# Patient Record
Sex: MALE | URBAN - METROPOLITAN AREA
[De-identification: names, ages, dates, MRNs, and addresses within clinical notes are randomized per-mention and may not be internally consistent; named-entity substitution may affect disease eponyms.]

---
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
## 2018-02-07 ENCOUNTER — OFFICE VISIT (OUTPATIENT)
Dept: PEDIATRICS | Facility: CLINIC | Age: 18
End: 2018-02-07
Payer: COMMERCIAL

## 2018-02-07 DIAGNOSIS — F90.0 ATTENTION DEFICIT HYPERACTIVITY DISORDER (ADHD), PREDOMINANTLY INATTENTIVE TYPE: Primary | ICD-10-CM

## 2018-02-07 DIAGNOSIS — F41.1 GENERALIZED ANXIETY DISORDER: ICD-10-CM

## 2018-02-07 NOTE — LETTER
"  2/7/2018      RE: Ramesh Jo  [address]  [facility] [address]       SUBJECTIVE:  Ramesh is a 17 year old male, here for follow-up of developmental-behavioral problems.     Interim History:    working 1 long shift per week at [employer] at the [employer]    enjoying taking clarinet lessons via [employer] (parents are paying for it) and teaching himself guitar; might do some clarinet stuff with friends who are in bands although he'd like to learn to improvise first    not reading or studying mathematics as he'd thought he'd do, feels there will be plenty of that when he again commits to some form of college    however, he's unsure about how to proceed with college -- considering starting again next fall, likely at  of [place] again, went to an orientation recently that's required for those wanting to do the create-your-own-major type of path but he's wondering if it's possible to that AND to have most/all of it be \"independent study\"/project/mentored type work because he feels that going to traditional lecture-based classes does not work for his brain.  He's okay with taking tests, writing papers, doing readings, etc., but this ideally happens best for him, he thinks, when he's able to plan out his path and learning objectives ahead of time    he'd like this area of study to include mathematics and physics, and possibly music    he's comfortable taking a lower credit load and/or taking longer to graduate if need be    he recently went back to see Dr. Vitale for individual psychotherapy after being absent a few months and plans to continue seeing him a few times over the next few months    Objective:  There were no vitals taken for this visit.   EXAM:  Developmental and Behavioral: affect normal/bright and mood congruent  impulse control appropriate for context  attention span appropriate for context  restless  hyperkinetic  social reciprocity appropriate for developmental age  joint attention appropriate " for developmental age  no preoccupations, stereotypies, or atypical behavioral mannerisms  judgment and insight intact  mentation appears normal  no tics       DATA:  The following standardized developmental-behavioral assessments were scored and interpreted today with them, distinct from the rest of the evaluation and management that took place:  1. n/a    As described below, today's Diagnostic ASSESSMENT and Diagnostic/Therapeutic PLAN were discussed with the patient and family, and I provided them with extensive counseling and eduction as follows:  1. Attention deficit hyperactivity disorder (ADHD), predominantly inattentive type    2. Generalized anxiety disorder        Overall, ongoing challenges with intrinsic motivation-reward, leading to current problems moving forward with academic-career goals, associated with attention-deficit/hyperactivity disorder and anxiety    Diagnostic Plan:    deferred     Counseled regarding:    self-efficacy    ego-strengthening suggestions    motivational interviewing regarding next steps and behavioral activation for academics    guidance regarding options including other campuses/settings, how to have construction concurrent conversations with academic advisors (and find the right people) and Disability Services so that they can all work together to help him find a good fit academically    Therapeutic Interventions:    continue individual psychotherapy with Dr. Vitale    Current Outpatient Prescriptions   Medication Sig Dispense Refill     ORDER FOR DME Equipment being ordered:emWave ([employer]) personal stress reliever and/or PC-desktop for heart rate variability biofeedback training. 2 each 0     There are no discontinued medications.      Psychotropic medication not indicated at this time     Follow-up -- 3-4 months       40 minutes and More than 50% of the time spent on counseling / coordinating care    Bin Perrin MD, MPH  , [facility]   [place]  Developmental-Behavioral Pediatrics  __________________________________________________________

## 2018-02-07 NOTE — MR AVS SNAPSHOT
After Visit Summary   2/7/2018    Ramesh Jo    MRN: [id number]           Patient Information     Date Of Birth          2000        Visit Information        Provider Department      2/7/2018 2:20 PM Bin Perrin MD [facility]        Today's Diagnoses     Attention deficit hyperactivity disorder (ADHD), predominantly inattentive type    -  1    Generalized anxiety disorder           Follow-ups after your visit        Your next 10 appointments already scheduled     Jun 27, 2018 11:00 AM CDT   RETURN EXTENDED with Bin Perrin MD   [facility] ([facility])    [address] [place] [address]  [address]  Mail Code 1932  [facility] [id number] [phone]              Who to contact     Please call your clinic at [phone] to:    Ask questions about your health    Make or cancel appointments    Discuss your medicines    Learn about your test results    Speak to your doctor   If you have compliments or concerns about an experience at your clinic, or if you wish to file a complaint, please contact [facility] Patient Relations at [phone] or email us at [email or web address]         Additional Information About Your Visit        MyChart Information     [facility] gives you secure access to your electronic health record. If you see a primary care provider, you can also send messages to your care team and make appointments. If you have questions, please call your primary care clinic.  If you do not have a primary care provider, please call [phone] and they will assist you.      [employer] is an electronic gateway that provides easy, online access to your medical records. With [employer], you can request a clinic appointment, read your test results, renew a prescription or communicate with your care team.     To access your existing account, please contact your [facility] or call [phone] for assistance.        Care EveryWhere ID     This is your Care EveryWhere ID. This could be used by other organizations to access your [facility] medical records  [id number]         Blood Pressure from Last 3 Encounters:   07/31/14 117/77   09/23/13 111/78   04/26/13 105/57    Weight from Last 3 Encounters:   07/31/14 125 lb 10.6 oz (57 kg) (29 %)*   09/23/13 123 lb 14.4 oz (56.2 kg) (40 %)*   04/26/13 120 lb (54.4 kg) (41 %)*     * Growth percentiles are based on [facility] 2-20 Years data.              Today, you had the following     No orders found for display       Primary Care Provider Office Phone # Fax #    Fidel Moody [phone]       [facility] [address] [address]  [address] [address]        Equal Access to Services     SOBEIDA SHAY AH: Hadii tyler ku hadasho Soomaali, waaxda luqadaha, qaybta kaalmada adeegyada, kayy fernández . So [facility] [phone].    ATENCIÓN: Si earle gallo, tiene a patricia disposición servicios gratuitos de asistencia lingüística. Llame al [phone].    We comply with applicable federal civil rights laws and [place] laws. We do not discriminate on the basis of race, color, national origin, age, disability, sex, sexual orientation, or gender identity.            Thank you!     Thank you for choosing [facility]  for your care. Our goal is always to provide you with excellent care. Hearing back from our patients is one way we can continue to improve our services. Please take a few minutes to complete the written survey that you may receive in the mail after your visit with us. Thank you!             Your Updated Medication List - Protect others around you: Learn how to safely use, store and throw away your medicines at www.disposemymeds.org.          This list is accurate as of 2/7/18 11:59 PM.  Always use your most recent med list.                   Brand  Name Dispense Instructions for use Diagnosis    order for DME     2 each    Equipment being ordered:emWave ([employer]) personal stress reliever and/or PC-desktop for heart rate variability biofeedback training.    Anxiety, Arachnophobia, Chronic motor tic disorder                  Developmental - Behavioral Pediatrics Clinic    Thank you for choosing [facility] for your health care needs. Below is some information for patients who are interested in having their follow-up visit with a physician by telephone. In some cases, a telephone visit can be an effective and convenient way to manage your follow-up care. Choosing a telephone visit rather than a face to face visit for your follow-up care is a decision that you and your physician can make together to ensure it meets all of your needs.  A face to face visit is always an available option, if you choose to do so.     We want to make sure you have all of the information you need about the telephone visit option and answer all of your questions before you decide to schedule a telephone follow-up visit. If you have any questions, you may talk to a staff member or our financial counselor at [phone].    1. General overview    Our clinic sees patients for a variety of conditions and concerns. A face to face visit with your doctor is required for any new concerns or for your initial visit. If you and your doctor decide that a follow up visit by telephone is appropriate, you may decide to opt for a telephone visit.     2.  Billing and insurance coverage    There is a charge for telephone visits, similar to the charge for an in-person visit. Your bill is based on the amount of time you and your physician are on the phone. We will bill each visit to your insurance company (just like your other medical visits), and you will be responsible for any costs not paid by your insurance company. Not all insurance companies cover theses visits. At this time,  we are aware that this is NOT a covered service by [place] Health Care Programs (Medical Assistance Plans), [facility] Blue Shield and Medicare. If you want to know what your insurance company will cover, we encourage you to contact them to determine your coverage. The codes below are the codes we use when billing for telephone visits and the associated charges. This may help you work with your insurance company to determine your benefits.       Billing CPT codes for Telephone visits   97156  5-10 minutes ($30)  38587  11-20 minutes ($35)  37436   21-30 minutes($40)    To schedule a telephone appointment call the clinic at: [phone] and press option #2.   ---------------------------------------------------------------------------------------------------------------------

## 2018-02-08 NOTE — PROGRESS NOTES
"SUBJECTIVE:  Ramesh is a 17 year old male, here for follow-up of developmental-behavioral problems.     Interim History:    working 1 long shift per week at [employer] at the [employer]    enjoying taking clarinet lessons via [employer] (parents are paying for it) and teaching himself guitar; might do some clarinet stuff with friends who are in bands although he'd like to learn to improvise first    not reading or studying mathematics as he'd thought he'd do, feels there will be plenty of that when he again commits to some form of college    however, he's unsure about how to proceed with college -- considering starting again next fall, likely at [employer] again, went to an orientation recently that's required for those wanting to do the create-your-own-major type of path but he's wondering if it's possible to that AND to have most/all of it be \"independent study\"/project/mentored type work because he feels that going to traditional lecture-based classes does not work for his brain.  He's okay with taking tests, writing papers, doing readings, etc., but this ideally happens best for him, he thinks, when he's able to plan out his path and learning objectives ahead of time    he'd like this area of study to include mathematics and physics, and possibly music    he's comfortable taking a lower credit load and/or taking longer to graduate if need be    he recently went back to see Dr. Vitale for individual psychotherapy after being absent a few months and plans to continue seeing him a few times over the next few months    Objective:  There were no vitals taken for this visit.   EXAM:  Developmental and Behavioral: affect normal/bright and mood congruent  impulse control appropriate for context  attention span appropriate for context  restless  hyperkinetic  social reciprocity appropriate for developmental age  joint attention appropriate for developmental age  no preoccupations, stereotypies, or atypical behavioral " mannerisms  judgment and insight intact  mentation appears normal  no tics       DATA:  The following standardized developmental-behavioral assessments were scored and interpreted today with them, distinct from the rest of the evaluation and management that took place:  1. n/a    As described below, today's Diagnostic ASSESSMENT and Diagnostic/Therapeutic PLAN were discussed with the patient and family, and I provided them with extensive counseling and eduction as follows:  1. Attention deficit hyperactivity disorder (ADHD), predominantly inattentive type    2. Generalized anxiety disorder        Overall, ongoing challenges with intrinsic motivation-reward, leading to current problems moving forward with academic-career goals, associated with attention-deficit/hyperactivity disorder and anxiety    Diagnostic Plan:    deferred     Counseled regarding:    self-efficacy    ego-strengthening suggestions    motivational interviewing regarding next steps and behavioral activation for academics    guidance regarding options including other campuses/settings, how to have construction concurrent conversations with academic advisors (and find the right people) and Disability Services so that they can all work together to help him find a good fit academically    Therapeutic Interventions:    continue individual psychotherapy with Dr. Vitale    Current Outpatient Prescriptions   Medication Sig Dispense Refill     ORDER FOR DME Equipment being ordered:emWave ([employer]) personal stress reliever and/or PC-desktop for heart rate variability biofeedback training. 2 each 0     There are no discontinued medications.      Psychotropic medication not indicated at this time     Follow-up -- 3-4 months       40 minutes and More than 50% of the time spent on counseling / coordinating care    Bin Perrin MD, MPH  , [facility]  Developmental-Behavioral  Pediatrics  __________________________________________________________

## 2018-04-18 ENCOUNTER — TRANSFERRED RECORDS (OUTPATIENT)
Dept: HEALTH INFORMATION MANAGEMENT | Facility: CLINIC | Age: 18
End: 2018-04-18

## 2018-04-25 ENCOUNTER — OFFICE VISIT (OUTPATIENT)
Dept: PEDIATRICS | Facility: CLINIC | Age: 18
End: 2018-04-25
Payer: COMMERCIAL

## 2018-04-25 DIAGNOSIS — F95.1 CHRONIC MOTOR TIC DISORDER: ICD-10-CM

## 2018-04-25 DIAGNOSIS — E88.89 CYTOCHROME P450 2C19 ENZYME DEFICIENCY (H): ICD-10-CM

## 2018-04-25 DIAGNOSIS — F41.1 GENERALIZED ANXIETY DISORDER: ICD-10-CM

## 2018-04-25 DIAGNOSIS — F90.0 ATTENTION DEFICIT HYPERACTIVITY DISORDER (ADHD), PREDOMINANTLY INATTENTIVE TYPE: Primary | ICD-10-CM

## 2018-04-25 RX ORDER — FLUVOXAMINE MALEATE 100 MG/1
100 CAPSULE, EXTENDED RELEASE ORAL AT BEDTIME
Qty: 30 CAPSULE | Refills: 0 | Status: SHIPPED | OUTPATIENT
Start: 2018-04-25 | End: 2018-06-06

## 2018-04-25 NOTE — LETTER
DEVELOPMENTAL BEHAVIORAL PED[facility]AT[facility]IC CLINIC  [address]  [address]  Mail Code 5502  [facility] [address]  Phone: [phone]  Fax: [phone]       April 25, 2018      Re: Ramesh Jo  YOB: 2000      To Whom It May Concern:    I have been seeing Mr. Jo in consultation as his Developmental-Behavioral Pediatrician since June 2012.  We see each other at least quarterly. He had neuropsychological assessment with Rajani Barnett, PhD at [facility] in 2014.     He has disabilities that impact his education due to the following conditions:  Encounter Diagnoses   Name Primary?     Attention deficit hyperactivity disorder (ADHD), predominantly inattentive type Yes     Generalized anxiety disorder      Chronic motor tic disorder       We have discussed the need for Individualized Study and project-based learning/research credits. I'm happy further any time.    Sincerely,    Bin Perrin MD, MPH, FAAP   & Fellowship Director  Developmental-Behavioral Pediatrics    Email: [email or web address]

## 2018-04-25 NOTE — PROGRESS NOTES
"SUBJECTIVE:  Ramesh is a 18 year old male here for follow-up of developmental-behavioral problems. Today's visit was spent with Neema Samano MD, PL2, rotating pediatric resident on the Developmental-Behavioral service.    Interim History:    discussed recent visit with Dr. Vitale -- see scans -- including how he deals with both his internal critic as well as his Mom, whom he feels is better able to wait for him to come to her for guidance now than when he was in high school, and also that he's better able to gently tell her that he doesn't want or need further help or guidance when necessary    his goal is firmly to get back to formal coursework at [employer] this fall; he still hasn't contacted the [employer] Disability Services office to register with them for support, but he feels that will be the first step and then he can ask them for guidance regarding Independent Study and designing his own major etc.    we also reviewed his anxiety symptoms at length, which include perseveration and low tolerance for ambiguity    he says he's no longer using LSD or related psychotomimetics except very rarely and thinks he'll soon stop \"at least until I'm older\" because he feels they are no longer particularly interesting or helpful to him, especially as he envisions resuming formal education    his current drug of choice is cannabis although he also feels like he'd rather not use it as he said something along the lines of \"it doesn't agree with me\"; he'd like to continue to use this socially \"and not when it's just me [alone]\" and yet he's recently used cannabis a few times \"because I feel like I need to [as opposed to wanting to] . . .  sometimes I just feel frozen up [with anxiety] until I use it\"    he recently visited [place] and was impressed with their careful measurement/testing/standardization of recreational cannabis    he's open to the possibility of using an SSRI again to help him feel more calm and comfortable, but ambivalent " "due to the past negative experiences he's had with such medications and also because he feels he'd rather use a medication along those lines for \"a few months\" and is wary of taking anything long-term    tics not problematic from his perspective; we didn't discuss how cannabis affects tic symptoms for him    Objective:  There were no vitals taken for this visit.   EXAM:  Developmental and Behavioral:   appeared adequately groomed except for being unshaven  affect normal/bright and mood congruent  impulse control appropriate for context  activity level appropriate for context  attention span appropriate for context  social reciprocity appropriate for developmental age  joint attention appropriate for developmental age  stereotyped/repetitive movements of complex motor tics that he often turned into drumming and movement  judgment and insight intact  mentation appears normal    DATA:  The following standardized developmental-behavioral assessments were scored and interpreted today with them, distinct from the rest of the evaluation and management that took place:  1. n/a    As described below, today's Diagnostic ASSESSMENT and Diagnostic/Therapeutic PLAN were discussed with the patient and family, and I provided them with extensive counseling and eduction as follows:  1. Attention deficit hyperactivity disorder (ADHD), predominantly inattentive type    2. Generalized anxiety disorder    3. 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  Tics stable.  Attention-deficit/hyperactivity disorder symptoms are overshadowed and also exacerbated by his anxiety, and his strengths in intellectual and executive function are prognostically positive.      Diagnostic Plan:    we reviewed his 2014 neuropsychological assessment today (as I wrote the letter with him to the [facility] Disability " "Services office and handed it to him to take to them), including his executive function testing (which was very extensive and solidly in the average range), and his and his Mom's self-reports of executive function problems via the BRIEF, Mom's perception of this being in the average range and his showing a self-perception of problems in multiple executive function domains (see Rajani Barnett, PhD's report for details) -- he attributed this discrepancy to Mom's tendency to normalize or minimize (although we didn't discuss yet the discrepancy between his self-report of executive function problems and the objective executive function testing results)    rule out Obsessive-Compulsive Disorder      Counseled regarding:    self-efficacy    ego-strengthening suggestions    multimodal evidence-based interventions for anxiety including self-regulation-oriented biobehavioral practices and cognitive strategies, as well as the role of SSRIs especially during periods of life transition or stress and not necessarily for years on end    psychoeducation about psychoactive properties of cannabis, including my impression that if he uses it he would be best off aiming for a strain with a very large CBD:THC ratio to help with anxiolysis and concentration without dysphoria    motivational interviewing regarding avoidance of LSD and relative psychotomimetic recreational drugs, including the risk of serotonin syndrome once he begins taking an SSRI to help him manage anxiety     motivational interviewing regarding his ambivalence about SSRIs by guiding him towards a self-efficacy model of  the role of these medications and also my impression that an SSRI will ameliorate or eliminate his \"frozen up\" feelings of anxiety that he's currently self-treating with cannabis (and thus reduce his \"alone\" use as he wishes to do)    collaborative problem-solving regarding engaging effectively with academic supports, including calling Disability Services " while I sat with him and setting up his own intake/registration appointment with them which is scheduled for next Monday (he'll bring the letter from today's visit) -- and care coordination with them as I talked with them about what they need from myself and/or Dr. Vitale in terms of a letter (they told me that all they require is a licensed professional signing off on diagnoses that could impact learning)    Therapeutic Interventions:    continue individual psychotherapy with Dr. Vitale     continue daily practices for self-regulation (we did not discuss today)    Current Outpatient Prescriptions   Medication Sig Dispense Refill     fluvoxaMINE Maleate (LUVOX CR) 100 MG 24 hr capsule Take 1 capsule (100 mg) by mouth At Bedtime 30 capsule 0     ORDER FOR DME Equipment being ordered:[employer]) personal stress reliever and/or PC-desktop for heart rate variability biofeedback training. 2 each 0     There are no discontinued medications.      MEDICATIONS:          - Trial of fluvoxamine  mg every night at bedtime; will titrate up by 50 mg every week if needed and as tolerated -- targeting anxiety symptoms     Follow-up -- 1 month, sooner if intolerable adverse effects from fluvoxamine     40 minutes and More than 50% of the time spent on counseling / coordinating care    Bin Perrin MD, MPH  , [facility]  Developmental-Behavioral Pediatrics  __________________________________________________________

## 2018-04-25 NOTE — MR AVS SNAPSHOT
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      Your next 10 appointments already scheduled     Jun 27, 2018 11:00 AM CDT   RETURN EXTENDED with Bin Perrin MD   [facility] ([facility])    [address] [place] [address]  [address]  Mail Code 1932  [facility] [address] [phone]            Oct 03, 2018  9:00 AM CDT   RETURN EXTENDED with Bin Perrin MD   [facility] ([facility])    [address] [place] [address]  Mail Code 1932  [facility] [address] [phone]              Who to contact     Please call your clinic at [phone] to:    Ask questions about your health    Make or cancel appointments    Discuss your medicines    Learn about your test results    Speak to your doctor            Additional Information About Your Visit        MyChart Information     [employer] gives you secure access to your electronic health record. If you see a primary care provider, you can also send messages to your care team and make appointments. If you have questions, please call your primary care clinic.  If you do not have a primary care provider, please call [phone] and they will assist you.      [employer] is an electronic gateway that provides easy, online access to your medical records.  With [employer], you can request a clinic appointment, read your test results, renew a prescription or communicate with your care team.     To access your existing account, please contact your [facility] or call [phone] for assistance.        Care EveryWhere ID     This is your Care EveryWhere ID. This could be used by other organizations to access your [place] medical records  [id number]         Blood Pressure from Last 3 Encounters:   07/31/14 117/77   09/23/13 111/78   04/26/13 105/57    Weight from Last 3 Encounters:   07/31/14 57 kg (125 lb 10.6 oz) (29 %)*   09/23/13 56.2 kg (123 lb 14.4 oz) (40 %)*   04/26/13 54.4 kg (120 lb) (41 %)*     * Growth percentiles are based on [facility] 2-20 Years data.              Today, you had the following     No orders found for display         Today's Medication Changes          These changes are accurate as of 4/25/18 11:59 PM.  If you have any questions, ask your nurse or doctor.               Start taking these medicines.        Dose/Directions    fluvoxaMINE Maleate 100 MG 24 hr capsule   Commonly known as:  LUVOX CR   Used for:  Generalized anxiety disorder        Dose:  100 mg   Take 1 capsule (100 mg) by mouth At Bedtime   Quantity:  30 capsule   Refills:  0            Where to get your medicines      Some of these will need a paper prescription and others can be bought over the counter.  Ask your nurse if you have questions.     Bring a paper prescription for each of these medications     fluvoxaMINE Maleate 100 MG 24 hr capsule                Primary Care Provider Office Phone # Fax #    Fidel Moody [phone]       [facility] [address]  [facility] [address]  [facility] [address]        Equal Access to Services     [facility] : ke Link, kayy cobb. So [facility] [phone].    ATENCIÓN: Si wei quiles  patricia disposición servicios gratuitos de asistencia lingüística. En toscano [phone].    We comply with applicable federal civil rights laws and [place] laws. We do not discriminate on the basis of race, color, national origin, age, disability, sex, sexual orientation, or gender identity.            Thank you!     Thank you for choosing [facility]  for your care. Our goal is always to provide you with excellent care. Hearing back from our patients is one way we can continue to improve our services. Please take a few minutes to complete the written survey that you may receive in the mail after your visit with us. Thank you!             Your Updated Medication List - Protect others around you: Learn how to safely use, store and throw away your medicines at www.disposemymeds.org.          This list is accurate as of 4/25/18 11:59 PM.  Always use your most recent med list.                   Brand Name Dispense Instructions for use Diagnosis    fluvoxaMINE Maleate 100 MG 24 hr capsule    LUVOX CR    30 capsule    Take 1 capsule (100 mg) by mouth At Bedtime    Generalized anxiety disorder       order for DME     2 each    Equipment being ordered:emWave ([employer]) personal stress reliever and/or PC-desktop for heart rate variability biofeedback training.    Anxiety, Arachnophobia, Chronic motor tic disorder                  [facility]al [facility]s Clinic    Thank you for choosing [facility] for your health care needs. Below is some information for patients who are interested in having their follow-up visit with a physician by telephone. In some cases, a telephone visit can be an effective and convenient way to manage your follow-up care. Choosing a telephone visit rather than a face to face visit for your follow-up care is a decision that you and your physician can make together to ensure it meets all of your needs.  A face to face visit is always an  available option, if you choose to do so.     We want to make sure you have all of the information you need about the telephone visit option and answer all of your questions before you decide to schedule a telephone follow-up visit. If you have any questions, you may talk to a staff member or our financial counselor at [phone].    1. General overview    Our clinic sees patients for a variety of conditions and concerns. A face to face visit with your doctor is required for any new concerns or for your initial visit. If you and your doctor decide that a follow up visit by telephone is appropriate, you may decide to opt for a telephone visit.     2.  Billing and insurance coverage    There is a charge for telephone visits, similar to the charge for an in-person visit. Your bill is based on the amount of time you and your physician are on the phone. We will bill each visit to your insurance company (just like your other medical visits), and you will be responsible for any costs not paid by your insurance company. Not all insurance companies cover theses visits. At this time, we are aware that this is NOT a covered service by [place] Health Care Programs (Medical Assistance Plans), [facility] and Medicare. If you want to know what your insurance company will cover, we encourage you to contact them to determine your coverage. The codes below are the codes we use when billing for telephone visits and the associated charges. This may help you work with your insurance company to determine your benefits.       Billing CPT codes for Telephone visits   95876  5-10 minutes ($30)  72252  11-20 minutes ($35)  47320   21-30 minutes($40)    To schedule a telephone appointment call the clinic at: [phone] and press option #2.   ---------------------------------------------------------------------------------------------------------------------

## 2018-04-25 NOTE — Clinical Note
Please send and fax of copy of my note from visit on 4/25, and the letter from that same date, to Dr. Jimmy Vitale at [facility]. Thanks!

## 2018-04-25 NOTE — LETTER
"  4/25/2018      RE: Ramesh Jo  [address]  [facility] [address]       SUBJECTIVE:  Ramesh is a 18 year old male here for follow-up of developmental-behavioral problems. Today's visit was spent with Neema Samano MD, PL2, rotating pediatric resident on the Developmental-Behavioral service.    Interim History:    discussed recent visit with Dr. Vitale -- see scans -- including how he deals with both his internal critic as well as his Mom, whom he feels is better able to wait for him to come to her for guidance now than when he was in high school, and also that he's better able to gently tell her that he doesn't want or need further help or guidance when necessary    his goal is firmly to get back to formal coursework at [employer] this fall; he still hasn't contacted the [email or web address]ability Services office to register with them for support, but he feels that will be the first step and then he can ask them for guidance regarding Independent Study and designing his own major etc.    we also reviewed his anxiety symptoms at length, which include perseveration and low tolerance for ambiguity    he says he's no longer using LSD or related psychotomimetics except very rarely and thinks he'll soon stop \"at least until I'm older\" because he feels they are no longer particularly interesting or helpful to him, especially as he envisions resuming formal education    his current drug of choice is cannabis although he also feels like he'd rather not use it as he said something along the lines of \"it doesn't agree with me\"; he'd like to continue to use this socially \"and not when it's just me [alone]\" and yet he's recently used cannabis a few times \"because I feel like I need to [as opposed to wanting to] . . .  sometimes I just feel frozen up [with anxiety] until I use it\"    he recently visited [place] and was impressed with their careful measurement/testing/standardization of recreational cannabis    he's open to the " "possibility of using an SSRI again to help him feel more calm and comfortable, but ambivalent due to the past negative experiences he's had with such medications and also because he feels he'd rather use a medication along those lines for \"a few months\" and is wary of taking anything long-term    tics not problematic from his perspective; we didn't discuss how cannabis affects tic symptoms for him    Objective:  There were no vitals taken for this visit.   EXAM:  Developmental and Behavioral:   appeared adequately groomed except for being unshaven  affect normal/bright and mood congruent  impulse control appropriate for context  activity level appropriate for context  attention span appropriate for context  social reciprocity appropriate for developmental age  joint attention appropriate for developmental age  stereotyped/repetitive movements of complex motor tics that he often turned into drumming and movement  judgment and insight intact  mentation appears normal    DATA:  The following standardized developmental-behavioral assessments were scored and interpreted today with them, distinct from the rest of the evaluation and management that took place:  1. n/a    As described below, today's Diagnostic ASSESSMENT and Diagnostic/Therapeutic PLAN were discussed with the patient and family, and I provided them with extensive counseling and eduction as follows:  1. Attention deficit hyperactivity disorder (ADHD), predominantly inattentive type    2. Generalized anxiety disorder    3. 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  Tics stable.  Attention-deficit/hyperactivity disorder symptoms are overshadowed and also exacerbated by his anxiety, and his strengths in intellectual and executive function are prognostically positive.      Diagnostic Plan:    we reviewed his " "2014 neuropsychological assessment today (as I wrote the letter with him to the [facility] Disability Services office and handed it to him to take to them), including his executive function testing (which was very extensive and solidly in the average range), and his and his Mom's self-reports of executive function problems via the BRIEF, Mom's perception of this being in the average range and his showing a self-perception of problems in multiple executive function domains (see Rajani Barnett, PhD's report for details) -- he attributed this discrepancy to Mom's tendency to normalize or minimize (although we didn't discuss yet the discrepancy between his self-report of executive function problems and the objective executive function testing results)    rule out Obsessive-Compulsive Disorder      Counseled regarding:    self-efficacy    ego-strengthening suggestions    multimodal evidence-based interventions for anxiety including self-regulation-oriented biobehavioral practices and cognitive strategies, as well as the role of SSRIs especially during periods of life transition or stress and not necessarily for years on end    psychoeducation about psychoactive properties of cannabis, including my impression that if he uses it he would be best off aiming for a strain with a very large CBD:THC ratio to help with anxiolysis and concentration without dysphoria    motivational interviewing regarding avoidance of LSD and relative psychotomimetic recreational drugs, including the risk of serotonin syndrome once he begins taking an SSRI to help him manage anxiety     motivational interviewing regarding his ambivalence about SSRIs by guiding him towards a self-efficacy model of  the role of these medications and also my impression that an SSRI will ameliorate or eliminate his \"frozen up\" feelings of anxiety that he's currently self-treating with cannabis (and thus reduce his \"alone\" use as he wishes to do)    collaborative problem-solving " regarding engaging effectively with academic supports, including calling Disability Services while I sat with him and setting up his own intake/registration appointment with them which is scheduled for next Monday (he'll bring the letter from today's visit) -- and care coordination with them as I talked with them about what they need from myself and/or Dr. Vitale in terms of a letter (they told me that all they require is a licensed professional signing off on diagnoses that could impact learning)    Therapeutic Interventions:    continue individual psychotherapy with Dr. Vitale     continue daily practices for self-regulation (we did not discuss today)    Current Outpatient Prescriptions   Medication Sig Dispense Refill     fluvoxaMINE Maleate (LUVOX CR) 100 MG 24 hr capsule Take 1 capsule (100 mg) by mouth At Bedtime 30 capsule 0     ORDER FOR DME Equipment being ordered:Be[employer] ([employer]) personal stress reliever and/or PC-desktop for heart rate variability biofeedback training. 2 each 0     There are no discontinued medications.      MEDICATIONS:          - Trial of fluvoxamine  mg every night at bedtime; will titrate up by 50 mg every week if needed and as tolerated -- targeting anxiety symptoms     Follow-up -- 1 month, sooner if intolerable adverse effects from fluvoxamine     40 minutes and More than 50% of the time spent on counseling / coordinating care    Bin Perrin MD, MPH  , [facility]  Developmental-Behavioral Pediatrics  __________________________________________________________         Bin Perrin MD

## 2018-06-06 ENCOUNTER — OFFICE VISIT (OUTPATIENT)
Dept: PEDIATRICS | Facility: CLINIC | Age: 18
End: 2018-06-06
Payer: COMMERCIAL

## 2018-06-06 DIAGNOSIS — F41.1 GENERALIZED ANXIETY DISORDER: ICD-10-CM

## 2018-06-06 DIAGNOSIS — F51.01 PRIMARY INSOMNIA: ICD-10-CM

## 2018-06-06 DIAGNOSIS — F90.0 ATTENTION DEFICIT HYPERACTIVITY DISORDER (ADHD), PREDOMINANTLY INATTENTIVE TYPE: Primary | ICD-10-CM

## 2018-06-06 DIAGNOSIS — E88.89 CYTOCHROME P450 2C19 ENZYME DEFICIENCY (H): ICD-10-CM

## 2018-06-06 DIAGNOSIS — F95.1 CHRONIC MOTOR TIC DISORDER: ICD-10-CM

## 2018-06-06 DIAGNOSIS — T43.205A ADVERSE EFFECT OF ANTIDEPRESSANT DRUG, INITIAL ENCOUNTER: ICD-10-CM

## 2018-06-06 NOTE — MR AVS SNAPSHOT
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        Adverse effect of antidepressant drug, initial encounter           Follow-ups after your visit        Your next 10 appointments already scheduled     Jun 27, 2018 11:00 AM CDT   RETURN EXTENDED with Bin Perrin MD   [facility] ([facility])    [address] [place] [address]  [address]  Mail Code 1932  [facility] [id number] [phone]            Oct 03, 2018  9:00 AM CDT   RETURN EXTENDED with Bin Perrin MD   [facility] ([facility])    [address] [place] [address]  [address]  Mail Code 1932  [facility] [id number] [phone]              Who to contact     Please call your clinic at [phone] to:    Ask questions about your health    Make or cancel appointments    Discuss your medicines    Learn about your test results    Speak to your doctor            Additional Information About Your Visit        In[employer] Information     [employer] gives you secure access to your electronic health record. If you see a primary care provider, you can also send messages to your care team and make appointments. If you have questions, please call your primary care clinic.  If you do not have a primary care provider, please call [phone] and they will assist you.      [employer] is an electronic gateway that provides easy, online access to your medical records. With  [employer], you can request a clinic appointment, read your test results, renew a prescription or communicate with your care team.     To access your existing account, please contact your [facility] or call [phone] for assistance.        Care EveryWhere ID     This is your Care EveryWhere ID. This could be used by other organizations to access your [place] medical records  [id number]         Blood Pressure from Last 3 Encounters:   07/31/14 117/77   09/23/13 111/78   04/26/13 105/57    Weight from Last 3 Encounters:   07/31/14 125 lb 10.6 oz (57 kg) (29 %)*   09/23/13 123 lb 14.4 oz (56.2 kg) (40 %)*   04/26/13 120 lb (54.4 kg) (41 %)*     * Growth percentiles are based on [facility] 2-20 Years data.              Today, you had the following     No orders found for display         Today's Medication Changes          These changes are accurate as of 6/6/18  1:32 PM.  If you have any questions, ask your nurse or doctor.               Stop taking these medicines if you haven't already. Please contact your care team if you have questions.     fluvoxaMINE Maleate 100 MG 24 hr capsule   Commonly known as:  LUVOX CR                    Primary Care Provider Office Phone # Fax #    Fidel Moody [phone]       [facility] [address] [place] [address]  [address] [address]        Equal Access to Services     [facility] : Hadii tyler Benitez, waaxda louie, qaybta kaalmada jose miguel, kayy fernández . So [facility] [phone].    ATENCIÓN: Si habla español, tiene a patricia disposición servicios gratuitos de asistencia lingüística. Llame al [phone].    We comply with applicable federal civil rights laws and [place] laws. We do not discriminate on the basis of race, color, national origin, age, disability, sex, sexual orientation, or gender identity.            Thank you!     Thank you for choosing [facility] CLINIC  for your care. Our goal is always to provide you with excellent care. Hearing back from our patients is one way we can continue to improve our services. Please take a few minutes to complete the written survey that you may receive in the mail after your visit with us. Thank you!             Your Updated Medication List - Protect others around you: Learn how to safely use, store and throw away your medicines at www.disposemymeds.org.          This list is accurate as of 6/6/18  1:32 PM.  Always use your most recent med list.                   Brand Name Dispense Instructions for use Diagnosis    order for DME     2 each    Equipment being ordered:emWave ([employer]) personal stress reliever and/or PC-desktop for heart rate variability biofeedback training.    Anxiety, Arachnophobia, Chronic motor tic disorder                  Developmental [facility]al Pediatrics Clinic    Thank you for choosing [facility] for your health care needs. Below is some information for patients who are interested in having their follow-up visit with a physician by telephone. In some cases, a telephone visit can be an effective and convenient way to manage your follow-up care. Choosing a telephone visit rather than a face to face visit for your follow-up care is a decision that you and your physician can make together to ensure it meets all of your needs.  A face to face visit is always an available option, if you choose to do so.     We want to make sure you have all of the information you need about the telephone visit option and answer all of your questions before you decide to schedule a telephone follow-up visit. If you have any questions, you may talk to a staff member or our financial counselor at [phone].    1. General overview    Our clinic sees patients for a variety of conditions and concerns. A face to face visit with your doctor is required for any new concerns or for your initial  visit. If you and your doctor decide that a follow up visit by telephone is appropriate, you may decide to opt for a telephone visit.     2.  Billing and insurance coverage    There is a charge for telephone visits, similar to the charge for an in-person visit. Your bill is based on the amount of time you and your physician are on the phone. We will bill each visit to your insurance company (just like your other medical visits), and you will be responsible for any costs not paid by your insurance company. Not all insurance companies cover theses visits. At this time, we are aware that this is NOT a covered service by [place] [employer] Care Programs (Medical Assistance Plans), [facility] and Medicare. If you want to know what your insurance company will cover, we encourage you to contact them to determine your coverage. The codes below are the codes we use when billing for telephone visits and the associated charges. This may help you work with your insurance company to determine your benefits.       Billing CPT codes for Telephone visits   98730  5-10 minutes ($30)  64725  11-20 minutes ($35)  91287   21-30 minutes($40)    To schedule a telephone appointment call the clinic at: [phone] and press option #2.   ---------------------------------------------------------------------------------------------------------------------

## 2018-06-06 NOTE — PROGRESS NOTES
"SUBJECTIVE:  Ramesh is a 18 year old male, here for follow-up of developmental-behavioral problems.    Interim History:    Luvox 100 mg poorly tolerated -- he took it at night and he had severe daytime somnolence that didn't improve even after 2 weeks, couldn't get himself to do anything, so he weaned off of it over 4 days without any problems    anxiety stable overall; feeling more like it's okay to do things on a timeline that makes sense for him; seeing Dr. Vitale every 1-2 weeks and feeling more comfortable and helped  with the work they are doing together    wants to get more physical activity -- likes walking outside especially in tree-filled areas; sleep better; and eat more regularly as he often skips meals    didn't yet talk with [facility] but still means to; he missed 2 scheduled appts with them    signed up for 4 classes next semester and looks forward to Drawing class especially \"even though I can't draw\"    Objective:  There were no vitals taken for this visit.   EXAM:  Examination deferred    DATA:  The following standardized developmental-behavioral assessments were scored and interpreted today with them, distinct from the rest of the evaluation and management that took place:  1. n/a    As described below, today's Diagnostic ASSESSMENT and Diagnostic/Therapeutic PLAN were discussed with the patient and family, and I provided them with extensive counseling and eduction as follows:  1. Attention deficit hyperactivity disorder (ADHD), predominantly inattentive type    2. Generalized anxiety disorder    3. Chronic motor tic disorder    4. Primary insomnia    5. 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 Adverse effect of antidepressant drug, initial encounter        Overall, making developmental progress in all areas.    Diagnostic Plan:    deferred     Counseled regarding:    self-efficacy    ego-strengthening suggestions    diet, physical activity, " "and sleep for his Ayurvedic type (Vata) -- extensive guidance and eduction with him about that, including relevant sections from Kirby's \"Chemistry of Gabrielle\" workbook    Therapeutic Interventions:    continue individual psychotherapy with Dr. Vitale    Current Outpatient Prescriptions   Medication Sig Dispense Refill     fluvoxaMINE Maleate (LUVOX CR) 100 MG 24 hr capsule Take 1 capsule (100 mg) by mouth At Bedtime 30 capsule 0     ORDER FOR DME Equipment being ordered:Freshdesk (Sparxent) personal stress reliever and/or PC-desktop for heart rate variability biofeedback training. 2 each 0     There are no discontinued medications.      he prefers to not take medication to help manage anxiety or attention-deficit/hyperactivity disorder symptoms at this time    he will consider OTC CBD products that may help with alert focus and anxiety self-regulation      Follow-up -- 1 month     40 minutes and More than 50% of the time spent on counseling / coordinating care    Bin Perrin MD, MPH  , [facility]  Developmental-Behavioral Pediatrics  __________________________________________________________       "

## 2018-06-06 NOTE — LETTER
"  6/6/2018      RE: Ramesh Jo  [address]  [facility] [facility] [address]       SUBJECTIVE:  Ramesh is a 18 year old male, here for follow-up of developmental-behavioral problems.    Interim History:    Luvox 100 mg poorly tolerated -- he took it at night and he had severe daytime somnolence that didn't improve even after 2 weeks, couldn't get himself to do anything, so he weaned off of it over 4 days without any problems    anxiety stable overall; feeling more like it's okay to do things on a timeline that makes sense for him; seeing Dr. Vitale every 1-2 weeks and feeling more comfortable and helped  with the work they are doing together    wants to get more physical activity -- likes walking outside especially in tree-filled areas; sleep better; and eat more regularly as he often skips meals    didn't yet talk with [facility] but still means to; he missed 2 scheduled appts with them    signed up for 4 classes next semester and looks forward to Drawing class especially \"even though I can't draw\"    Objective:  There were no vitals taken for this visit.   EXAM:  Examination deferred    DATA:  The following standardized developmental-behavioral assessments were scored and interpreted today with them, distinct from the rest of the evaluation and management that took place:  1. n/a    As described below, today's Diagnostic ASSESSMENT and Diagnostic/Therapeutic PLAN were discussed with the patient and family, and I provided them with extensive counseling and eduction as follows:  1. Attention deficit hyperactivity disorder (ADHD), predominantly inattentive type    2. Generalized anxiety disorder    3. Chronic motor tic disorder    4. Primary insomnia    5. 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 Adverse effect of antidepressant drug, initial encounter        Overall, making developmental progress in all areas.    Diagnostic Plan:    deferred     Counseled " "regarding:    self-efficacy    ego-strengthening suggestions    diet, physical activity, and sleep for his Ayurvedic type (Vata) -- extensive guidance and eduction with him about that, including relevant sections from Kirby's \"Chemistry of Gabrielle\" workbook    Therapeutic Interventions:    continue individual psychotherapy with Dr. Vitale    Current Outpatient Prescriptions   Medication Sig Dispense Refill     fluvoxaMINE Maleate (LUVOX CR) 100 MG 24 hr capsule Take 1 capsule (100 mg) by mouth At Bedtime 30 capsule 0     ORDER FOR DME Equipment being ordered:[employer] (ChronoWake) personal stress reliever and/or PC-desktop for heart rate variability biofeedback training. 2 each 0     There are no discontinued medications.      he prefers to not take medication to help manage anxiety or attention-deficit/hyperactivity disorder symptoms at this time    he will consider OTC CBD products that may help with alert focus and anxiety self-regulation      Follow-up -- 1 month     40 minutes and More than 50% of the time spent on counseling / coordinating care    Bin Perrin MD, MPH  , [facility] [place] [facility]  Developmental-Behavioral Pediatrics  __________________________________________________________         Bin Perrin MD    "

## 2018-08-01 ENCOUNTER — OFFICE VISIT (OUTPATIENT)
Dept: PEDIATRICS | Facility: CLINIC | Age: 18
End: 2018-08-01
Payer: COMMERCIAL

## 2018-08-01 DIAGNOSIS — F95.1 CHRONIC MOTOR TIC DISORDER: ICD-10-CM

## 2018-08-01 DIAGNOSIS — F41.1 GENERALIZED ANXIETY DISORDER: Primary | ICD-10-CM

## 2018-08-01 DIAGNOSIS — F90.0 ATTENTION DEFICIT HYPERACTIVITY DISORDER (ADHD), PREDOMINANTLY INATTENTIVE TYPE: ICD-10-CM

## 2018-08-01 DIAGNOSIS — F51.01 PRIMARY INSOMNIA: ICD-10-CM

## 2018-08-01 NOTE — PROGRESS NOTES
"SUBJECTIVE:  Ramesh is a 18 year old male, here for follow-up of developmental-behavioral problems.  Joined for today's visit by Karen Carvalho MP2, rotating Developmental-Behavioral Pediatrics resident.      Interim History:    summer has been slow, not much going on for him most days; he had a part-time job but wasn't being given enough hours so he decided to quit the job    looking forward to resuming school in the fall; has classes 3 days/wk    he feels that he still has difficulty focusing on reading, he feels he's working on it this summer because being able to read assigned materials is a school goal for him this year -- he feels that he's made some progress but not as much he hopes to make over time    he's living with his friends, he finds them supportive for his overall academic goals too    continues to meet with Dr. Vitale, continues to work towards his \"focus\" goals there as well including diving right in/getting started and dealing with self-criticism    Objective:  There were no vitals taken for this visit.   EXAM:  Examination deferred    DATA:  The following standardized developmental-behavioral assessments were scored and interpreted today with them, distinct from the rest of the evaluation and management that took place:  1. n/a    As described below, today's Diagnostic ASSESSMENT and Diagnostic/Therapeutic PLAN were discussed with the patient and family, and I provided them with extensive counseling and eduction as follows:  1. Generalized anxiety disorder    2. Chronic motor tic disorder    3. Attention deficit hyperactivity disorder (ADHD), predominantly inattentive type    4. Primary insomnia        Overall, stable.    Diagnostic Plan:    deferred     Counseled regarding:    self-efficacy    ego-strengthening suggestions    guidance and motivational interviewing regarding talking with Dr. Vitale about alert hypnotic strategies for reading and learning -- we looked at some of the examples in the " Juaquin text including the Wark Alert trance as examples    nutrition, sleep, and physical activity as health promoting activities     working with school Disability Services -- he will again make an appointment for this    Therapeutic Interventions:    continue individual psychotherapy     Current Outpatient Prescriptions   Medication Sig Dispense Refill     ORDER FOR DME Equipment being ordered:emWave (Heartmath) personal stress reliever and/or PC-desktop for heart rate variability biofeedback training. 2 each 0     There are no discontinued medications.      Psychotropic medication not indicated at this time     Follow-up -- 3-4 months     40 minutes and More than 50% of the time spent on counseling / coordinating care    Bin Perrin MD, MPH  , [facility]  Developmental-Behavioral Pediatrics  __________________________________________________________

## 2018-08-01 NOTE — LETTER
"  8/1/2018      RE: Ramesh Jo  [address]  [place] [facility] [address]       SUBJECTIVE:  Ramesh is a 18 year old male, here for follow-up of developmental-behavioral problems.  Joined for today's visit by Karen Carvalho MP2, rotating Developmental-Behavioral Pediatrics resident.      Interim History:    summer has been slow, not much going on for him most days; he had a part-time job but wasn't being given enough hours so he decided to quit the job    looking forward to resuming school in the fall; has classes 3 days/wk    he feels that he still has difficulty focusing on reading, he feels he's working on it this summer because being able to read assigned materials is a school goal for him this year -- he feels that he's made some progress but not as much he hopes to make over time    he's living with his friends, he finds them supportive for his overall academic goals too    continues to meet with Dr. Vitale, continues to work towards his \"focus\" goals there as well including diving right in/getting started and dealing with self-criticism    Objective:  There were no vitals taken for this visit.   EXAM:  Examination deferred    DATA:  The following standardized developmental-behavioral assessments were scored and interpreted today with them, distinct from the rest of the evaluation and management that took place:  1. n/a    As described below, today's Diagnostic ASSESSMENT and Diagnostic/Therapeutic PLAN were discussed with the patient and family, and I provided them with extensive counseling and eduction as follows:  1. Generalized anxiety disorder    2. Chronic motor tic disorder    3. Attention deficit hyperactivity disorder (ADHD), predominantly inattentive type    4. Primary insomnia        Overall, stable.    Diagnostic Plan:    deferred     Counseled regarding:    self-efficacy    ego-strengthening suggestions    guidance and motivational interviewing regarding talking with Dr. Vitale about alert " hypnotic strategies for reading and learning -- we looked at some of the examples in the Reza text including the Wark Alert trance as examples    nutrition, sleep, and physical activity as health promoting activities     working with school Disability Services -- he will again make an appointment for this    Therapeutic Interventions:    continue individual psychotherapy     Current Outpatient Prescriptions   Medication Sig Dispense Refill     ORDER FOR DME Equipment being ordered:emWave (Heartmath) personal stress reliever and/or PC-desktop for heart rate variability biofeedback training. 2 each 0     There are no discontinued medications.      Psychotropic medication not indicated at this time     Follow-up -- 3-4 months     40 minutes and More than 50% of the time spent on counseling / coordinating care    Bin Perrin MD, MPH  , [facility]  Developmental-Behavioral Pediatrics  __________________________________________________________         Bin Perrin MD

## 2018-08-01 NOTE — MR AVS SNAPSHOT
After Visit Summary   8/1/2018    Ramesh Jo    MRN: [id number]           Patient Information     Date Of Birth          2000        Visit Information        Provider Department      8/1/2018 11:00 AM Bin Perrin MD [facility]        Today's Diagnoses     Generalized anxiety disorder    -  1    Chronic motor tic disorder        Attention deficit hyperactivity disorder (ADHD), predominantly inattentive type        Primary insomnia           Follow-ups after your visit        Your next 10 appointments already scheduled     Oct 03, 2018  9:00 AM CDT   RETURN EXTENDED with Bin Perrin MD   Developmental Behavioral Pediatric [facility] ([facility])    [address] [place] [address]  [address]  Mail Code 1932  [facility] [id number] [phone]              Who to contact     Please call your clinic at [phone] to:    Ask questions about your health    Make or cancel appointments    Discuss your medicines    Learn about your test results    Speak to your doctor            Additional Information About Your Visit        MyChart Information     PT PAL gives you secure access to your electronic health record. If you see a primary care provider, you can also send messages to your care team and make appointments. If you have questions, please call your primary care clinic.  If you do not have a primary care provider, please call [phone] and they will assist you.      PT PAL is an electronic gateway that provides easy, online access to your medical records. With PT PAL, you can request a clinic appointment, read your test results, renew a prescription or communicate with your care team.     To access your existing account, please contact your [facility] or call [phone] for assistance.        Care EveryWhere ID     This is your Care EveryWhere ID. This could be used by other organizations to access your [place]  medical records  [id number]         Blood Pressure from Last 3 Encounters:   07/31/14 117/77   09/23/13 111/78   04/26/13 105/57    Weight from Last 3 Encounters:   07/31/14 125 lb 10.6 oz (57 kg) (29 %)*   09/23/13 123 lb 14.4 oz (56.2 kg) (40 %)*   04/26/13 120 lb (54.4 kg) (41 %)*     * Growth percentiles are based on [facility] 2-20 Years data.              Today, you had the following     No orders found for display       Primary Care Provider Office Phone # Fax #    Fidel Moody [phone]       [facility] [address] [place] [address]  [address] [address]        Equal Access to Services     SOBEIDA SHAY : Hadii tyler blackwello Sodarrell, waaxda luqadaha, qaybta kaalmada adeegyada, kayy fernández . So [facility] [phone].    ATENCIÓN: Si habla español, tiene a patricia disposición servicios gratuitos de asistencia lingüística. Llame al [phone].    We comply with applicable federal civil rights laws and [place] laws. We do not discriminate on the basis of race, color, national origin, age, disability, sex, sexual orientation, or gender identity.            Thank you!     Thank you for choosing DEVELOPMENT[facility]  for your care. Our goal is always to provide you with excellent care. Hearing back from our patients is one way we can continue to improve our services. Please take a few minutes to complete the written survey that you may receive in the mail after your visit with us. Thank you!             Your Updated Medication List - Protect others around you: Learn how to safely use, store and throw away your medicines at www.disposemymeds.org.          This list is accurate as of 8/1/18 11:59 PM.  Always use your most recent med list.                   Brand Name Dispense Instructions for use Diagnosis    order for DME     2 each    Equipment being ordered:emWave (Heartmath) personal stress reliever and/or PC-desktop for heart rate  variability biofeedback training.    Anxiety, Arachnophobia, Chronic motor tic disorder                  Developmental - Behavioral Pediatrics Clinic    Thank you for choosing [facility] for your health care needs. Below is some information for patients who are interested in having their follow-up visit with a physician by telephone. In some cases, a telephone visit can be an effective and convenient way to manage your follow-up care. Choosing a telephone visit rather than a face to face visit for your follow-up care is a decision that you and your physician can make together to ensure it meets all of your needs.  A face to face visit is always an available option, if you choose to do so.     We want to make sure you have all of the information you need about the telephone visit option and answer all of your questions before you decide to schedule a telephone follow-up visit. If you have any questions, you may talk to a staff member or our financial counselor at [phone].    1. General overview    Our clinic sees patients for a variety of conditions and concerns. A face to face visit with your doctor is required for any new concerns or for your initial visit. If you and your doctor decide that a follow up visit by telephone is appropriate, you may decide to opt for a telephone visit.     2.  Billing and insurance coverage    There is a charge for telephone visits, similar to the charge for an in-person visit. Your bill is based on the amount of time you and your physician are on the phone. We will bill each visit to your insurance company (just like your other medical visits), and you will be responsible for any costs not paid by your insurance company. Not all insurance companies cover theses visits. At this time, we are aware that this is NOT a covered service by [place] Health Care Programs (Medical Assistance Plans), Blue Cross Blue Shield and Medicare. If you want to know what your  insurance company will cover, we encourage you to contact them to determine your coverage. The codes below are the codes we use when billing for telephone visits and the associated charges. This may help you work with your insurance company to determine your benefits.       Billing CPT codes for Telephone visits   97073  5-10 minutes ($30)  46106  11-20 minutes ($35)  69186   21-30 minutes($40)    To schedule a telephone appointment call the clinic at: [phone] and press option #2.   ---------------------------------------------------------------------------------------------------------------------

## 2018-08-31 ENCOUNTER — OFFICE VISIT (OUTPATIENT)
Dept: PEDIATRICS | Facility: CLINIC | Age: 18
End: 2018-08-31
Payer: COMMERCIAL

## 2018-08-31 DIAGNOSIS — F41.1 GENERALIZED ANXIETY DISORDER: Primary | ICD-10-CM

## 2018-08-31 DIAGNOSIS — F95.1 CHRONIC MOTOR TIC DISORDER: ICD-10-CM

## 2018-08-31 DIAGNOSIS — F51.01 PRIMARY INSOMNIA: ICD-10-CM

## 2018-08-31 DIAGNOSIS — F90.0 ATTENTION DEFICIT HYPERACTIVITY DISORDER (ADHD), PREDOMINANTLY INATTENTIVE TYPE: ICD-10-CM

## 2018-08-31 NOTE — LETTER
Date:September 4, 2018      Patient was self referred, no letter generated. Do not send.        [facility] Information

## 2018-08-31 NOTE — LETTER
"  8/31/2018      RE: Ramesh Jo  [address]  [place] [place] [address]       SUBJECTIVE:  Ramesh is a 18 year old male, here for follow-up of developmental-behavioral problems.    Interim History:    will work at [employer] in [place] about 14 hours/wk (on days he has class) which leaves 2 days/wk plus weekends free for study and fun    he's got 15 credits this semester, as he restarts school     he's never really used an time management and organization strategies consistently, he's found that written to-dos and set times for \"work\" has worked before, and maybe upcoming semester having time set aside in a general way for work on weekends    he's tried to do self-hypnosis at home but finds it's not as deep as he'd prefer and wonders if that means it won't \"work\"; most of this has included listening to guided tracks on Spotify; he said he feels that more indirect and metaphoric suggestions often work well for him    I also talked by phone with Mom for 10 minutes regarding her and his worry that his parents do too much for him or in their words \"enable\" somehow, and her worries that he's not moving forward in spite of engaging well in therapy with  and/or that he's not being forthcoming enough about problems he has when he talks with Dr. Vitale or me      Objective:  There were no vitals taken for this visit.   EXAM:  Examination deferred    DATA:  The following standardized developmental-behavioral assessments were scored and interpreted today with them, distinct from the rest of the evaluation and management that took place:  1. n/a    As described below, today's Diagnostic ASSESSMENT and Diagnostic/Therapeutic PLAN were discussed with the patient and family, and I provided them with extensive counseling and eduction as follows:  1. Generalized anxiety disorder    2. Attention deficit hyperactivity disorder (ADHD), predominantly inattentive type    3. Primary insomnia    4. Chronic motor tic " disorder        Overall, stable.    Diagnostic Plan:    deferred     Counseled regarding:    self-efficacy    ego-strengthening suggestions    self-hypnosis -- practiced alert, active focus suggestions and recommend daily practice and then use for studying, school, work, etc.; also recommend Fidel Chris's Focus audio series    time management and organization strategies including to-do lists and time-blocking    Therapeutic Interventions:    deferred     Current Outpatient Prescriptions   Medication Sig Dispense Refill     ORDER FOR DME Equipment being ordered:emWave (Heart[employer]HLAL[employer] Mobile) personal stress reliever and/or PC-desktop for heart rate variability biofeedback training. 2 each 0     There are no discontinued medications.      Psychotropic medication deferred     Follow-up -- 1 month     40 minutes and More than 50% of the time spent on counseling / coordinating care    Bin Perrin MD, MPH  , [facility]  Developmental-Behavioral Pediatrics  __________________________________________________________         Bin Perrin MD

## 2018-08-31 NOTE — MR AVS SNAPSHOT
After Visit Summary   8/31/2018    Ramesh Jo    MRN: [id number]           Patient Information     Date Of Birth          2000        Visit Information        Provider Department      8/31/2018 10:00 AM Bin Perrin MD [facility]        Today's Diagnoses     Generalized anxiety disorder    -  1    Attention deficit hyperactivity disorder (ADHD), predominantly inattentive type        Primary insomnia        Chronic motor tic disorder           Follow-ups after your visit        Your next 10 appointments already scheduled     Oct 03, 2018  9:00 AM CDT   RETURN EXTENDED with Bin Perrin MD   [facility] ([facility])    [address] [place] [address]  [address]  Mail Code 1932  [facility] [id number] [phone]              Who to contact     Please call your clinic at [phone] to:    Ask questions about your health    Make or cancel appointments    Discuss your medicines    Learn about your test results    Speak to your doctor            Additional Information About Your Visit        MyChart Information     [employer] gives you secure access to your electronic health record. If you see a primary care provider, you can also send messages to your care team and make appointments. If you have questions, please call your primary care clinic.  If you do not have a primary care provider, please call [phone] and they will assist you.      [employer] is an electronic gateway that provides easy, online access to your medical records. With [employer], you can request a clinic appointment, read your test results, renew a prescription or communicate with your care team.     To access your existing account, please contact your [facility] or call [phone] for assistance.        Care EveryWhere ID     This is your Care EveryWhere ID. This could be used by other organizations to access your  [facility] medical records  [id number]         Blood Pressure from Last 3 Encounters:   07/31/14 117/77   09/23/13 111/78   04/26/13 105/57    Weight from Last 3 Encounters:   07/31/14 57 kg (125 lb 10.6 oz) (29 %)*   09/23/13 56.2 kg (123 lb 14.4 oz) (40 %)*   04/26/13 54.4 kg (120 lb) (41 %)*     * Growth percentiles are based on [facility] 2-20 Years data.              Today, you had the following     No orders found for display       Primary Care Provider    None Specified       No primary provider on file.        Equal Access to Services     [facility]: Hadleia Benitez, ke palma, ifeanyi avilaalmajennifer gillespie, kayy fernández . So [facility] [phone].    ATENCIÓN: Si habla español, tiene a patricia disposición servicios gratuitos de asistencia lingüística. Llame al [phone].    We comply with applicable federal civil rights laws and [place] laws. We do not discriminate on the basis of race, color, national origin, age, disability, sex, sexual orientation, or gender identity.            Thank you!     Thank you for choosing [facility]  for your care. Our goal is always to provide you with excellent care. Hearing back from our patients is one way we can continue to improve our services. Please take a few minutes to complete the written survey that you may receive in the mail after your visit with us. Thank you!             Your Updated Medication List - Protect others around you: Learn how to safely use, store and throw away your medicines at www.disposemymeds.org.          This list is accurate as of 8/31/18  1:28 PM.  Always use your most recent med list.                   Brand Name Dispense Instructions for use Diagnosis    order for DME     2 each    Equipment being ordered:emWave (Heartmath) personal stress reliever and/or PC-desktop for heart rate variability biofeedback training.    Anxiety, Arachnophobia, Chronic motor tic disorder                   [facility]    Thank you for choosing [facility] for your health care needs. Below is some information for patients who are interested in having their follow-up visit with a physician by telephone. In some cases, a telephone visit can be an effective and convenient way to manage your follow-up care. Choosing a telephone visit rather than a face to face visit for your follow-up care is a decision that you and your physician can make together to ensure it meets all of your needs.  A face to face visit is always an available option, if you choose to do so.     We want to make sure you have all of the information you need about the telephone visit option and answer all of your questions before you decide to schedule a telephone follow-up visit. If you have any questions, you may talk to a staff member or our financial counselor at [phone].    1. General overview    Our clinic sees patients for a variety of conditions and concerns. A face to face visit with your doctor is required for any new concerns or for your initial visit. If you and your doctor decide that a follow up visit by telephone is appropriate, you may decide to opt for a telephone visit.     2.  Billing and insurance coverage    There is a charge for telephone visits, similar to the charge for an in-person visit. Your bill is based on the amount of time you and your physician are on the phone. We will bill each visit to your insurance company (just like your other medical visits), and you will be responsible for any costs not paid by your insurance company. Not all insurance companies cover theses visits. At this time, we are aware that this is NOT a covered service by [place] Health Care Programs (Medical Assistance Plans), Blue Cross Blue Shield and Medicare. If you want to know what your insurance company will cover, we encourage you to contact them to determine your coverage.  The codes below are the codes we use when billing for telephone visits and the associated charges. This may help you work with your insurance company to determine your benefits.       Billing CPT codes for Telephone visits   91355  5-10 minutes ($30)  47872  11-20 minutes ($35)  63629   21-30 minutes($40)    To schedule a telephone appointment call the clinic at: [phone] and press option #2.   ---------------------------------------------------------------------------------------------------------------------

## 2018-10-03 ENCOUNTER — OFFICE VISIT (OUTPATIENT)
Dept: PEDIATRICS | Facility: CLINIC | Age: 18
End: 2018-10-03
Payer: COMMERCIAL

## 2018-10-03 DIAGNOSIS — F95.1 CHRONIC MOTOR TIC DISORDER: ICD-10-CM

## 2018-10-03 DIAGNOSIS — E88.89 CYTOCHROME P450 2C19 ENZYME DEFICIENCY (H): ICD-10-CM

## 2018-10-03 DIAGNOSIS — F51.01 PRIMARY INSOMNIA: ICD-10-CM

## 2018-10-03 DIAGNOSIS — F41.1 GENERALIZED ANXIETY DISORDER: Primary | ICD-10-CM

## 2018-10-03 DIAGNOSIS — F90.0 ATTENTION DEFICIT HYPERACTIVITY DISORDER (ADHD), PREDOMINANTLY INATTENTIVE TYPE: ICD-10-CM

## 2018-10-03 NOTE — LETTER
Date:October 4, 2018      Patient was self referred, no letter generated. Do not send.        [facility] Health Information

## 2018-10-03 NOTE — PROGRESS NOTES
"SUBJECTIVE:  Ramesh is a 18 year old male, here  for follow-up of developmental-behavioral problems.     Interim History:    he has been dealing with the increasing \"urge\" to quit school but he's not yet sure what would replace it yet; he's enjoying his job at [employer]; feels that he needs/wants one or more academic \"mentors\" as Dr. Vitale recently suggested but has no ideas about whom this might be (and wonders too about mentors outside of academics)    regarding his email below -- which we discussed -- he says that some of this, especially the part about trying medications again, is more his Mom's idea than his; he remains ambivalent and skeptical about medications but feels that microdosing LSD would be the best option assuming he finds out purity and dose    he's still wondering about hypnosis -- hasn't followed up with Dr. Vitale about using formal hypnosis methods, continues to feel that self-hypnosis is challenging for him to do but he wants to be able to use it to move forward with his goals    goals right now include:    shift and persist --  he developed with me today a metaphor of growing plants in the cold and moving them in until the time and temperature are right for that plant    one thing at a time -- he developed a metaphor of the nozzle on a garden watering hose, with different settings only one of which is used at time, and doing one part of watering at a time for the different parts of the garden    Objective:  There were no vitals taken for this visit.   EXAM:  Examination deferred    DATA:  The following standardized developmental-behavioral assessments were scored and interpreted today with them, distinct from the rest of the evaluation and management that took place:  1. n/a    As described below, today's Diagnostic ASSESSMENT and Diagnostic/Therapeutic PLAN were discussed with the patient and family, and I provided them with extensive counseling and eduction as follows:  1. Generalized anxiety " disorder    2. Attention deficit hyperactivity disorder (ADHD), predominantly inattentive type    3. Chronic motor tic disorder    4. 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    Diagnostic Plan:    deferred but rule out Obsessive-Compulsive Disorder     Counseled regarding:    self-efficacy    ego-strengthening suggestions    motivational interviewing regarding resolving his ambivalence about staying in school or not    guidance and education regarding multimodal, evidence-based interventions for attention-deficit/hyperactivity disorder and anxiety     self-hypnosis using metaphors above for his self-identified goals; alert, open-eyed with gaze-fixation induction (and I have him a gaze-fixation device to use at home)    Therapeutic Interventions:    continue individual psychotherapy with Dr. Vitale    Current Outpatient Prescriptions   Medication Sig Dispense Refill     ORDER FOR DME Equipment being ordered:emWave ([employer]) personal stress reliever and/or PC-desktop for heart rate variability biofeedback training. 2 each 0     There are no discontinued medications.      consider Effexor XR to target anxious and inattentive symptoms      Follow-up -- 1-3 months per his preference     40 minutes and More than 50% of the time spent on counseling / coordinating care    Bin Perrin MD, MPH  , [facility]  Developmental-Behavioral Pediatrics  __________________________________________________________         On Sep 27, 2018, at 5:25 PM, Ramesh Jo <[email or web address] wrote:    Donald Perrin       Iâ  ve been having a lot of trouble starting the school year and getting through each day in general. Most of the time Iâ  m not really able to do much of anything other than avoid most of my commitments and even a lot of situations I would ordinarily enjoy. I think it might have  been a bit of a hasty decision going back, but Iâ  m working with the disability office and some other resources around campus to explore what I can do about potentially taking a reduced load or another leave, and also about the alternative learning options that might be available like we have talked about. Overall, Iâ  m a lot more concerned about figuring out how to handle all these situations in general mentally, and not so much about what I consider relatively small things like school that would probably resolve with much more ease if I was able to handle my anxiety. Still I think building up the ability to do those small things Iâ  ve slowly withdrawn from over time is probably a big part of what I need to work on, but Iâ  m not quite sure what the balance is between making some forward motion and appeasing my urge to give up on most things.    I have a lot of weekly appointments set up with Dr. Vitale, which based on both of our availabilities hasnâ  t happened in this kind of a streak before, and I really think that will help quite a bit. Still, sometimes as good as the advice he gives me and the conversations we have are, I have so much trouble putting anything into action, that things kind of stagnate and revert back to where they were before seeing him again in a bit of a cycle. I really do think that the consistency I have lined up in the next few weeks and hopefully after that if I think thatâ  s necessary will help with that, as heâ  s pretty understanding of my inability to work on the things we talk about.    I am once again wondering if a little bit of a boost with some kind of medication might be helpful, it seems like everything is a little too much for me to handle on my own, but as always Iâ  m a little skeptical of potential side effects and so on.  My mom has suggested looking into Wellbutrin, which I donâ  t know too much about other than that it is not an SSRI, or related class of medications,  which makes me a little more open to maybe trying a low dose, but more than anything I guess Iâ  d like to hear what your thoughts are and have a conversation about that. Another thing Iâ  ve thought about is giving the idea of microdosing with LSD a try, which is something I think I have a little bit of confidence in just from my own experiences. From the one or two times I tried this, I found it to be effective enough to at least be curious about going through something like a month long regimen with doses spaced apart by a few days, like some people have recommended in what Iâ  ve read online. The only downside I noticed in my limited experience was that kind of stimulation weâ  ve talked about that I just canâ  t stand, but this was with what I can at best approximate to be about 10 micrograms, and people online have recommended that if there is any noticeable physical effect like that, 5 or even as low as 2 can be very helpful to many people.    These are just my preliminary thoughts, and more than anything Iâ  d like to hear what you have to say, maybe once youâ  re able to get back to me, we can think about scheduling a phone appointment or something? As always, thanks a lot for all your help and hope youâ  re doing well,    Ramesh

## 2018-10-03 NOTE — MR AVS SNAPSHOT
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 If you see a primary care provider, you can also send messages to your care team and make appointments. If you have questions, please call your primary care clinic.  If you do not have a primary care provider, please call [phone] and they will assist you.      [employer] is an electronic gateway that provides easy, online access to your medical records. With [employer], you can request a clinic appointment, read your test results, renew a prescription or communicate with your care team.     To access your existing account, please contact your [facility] or call [phone] for assistance.        Care EveryWhere ID     This is your Care EveryWhere ID. This could be used by other organizations to access your [place] medical records  [id number]         Blood Pressure from Last 3 Encounters:   07/31/14 117/77   09/23/13 111/78   04/26/13 105/57    Weight from Last 3 Encounters:   07/31/14 125 lb 10.6 oz (57 kg) (29  %)*   09/23/13 123 lb 14.4 oz (56.2 kg) (40 %)*   04/26/13 120 lb (54.4 kg) (41 %)*     * Growth percentiles are based on [facility] 2-20 Years data.              Today, you had the following     No orders found for display       Primary Care Provider    None Specified       No primary provider on file.        Equal Access to Services     [facility]: Hadii aad ku hadasho Soomaali, waaxda luqadaha, qaybta kaalmada adeegyada, waxay idiin hayaan adeeg andrésyonatan labrittanyn . So [facility] [phone].    ATENCIÓN: Si habla español, tiene a patricia disposición servicios gratuitos de asistencia lingüística. Llame al [phone].    We comply with applicable federal civil rights laws and [place] laws. We do not discriminate on the basis of race, color, national origin, age, disability, sex, sexual orientation, or gender identity.            Thank you!     Thank you for choosing [facility]  for your care. Our goal is always to provide you with excellent care. Hearing back from our patients is one way we can continue to improve our services. Please take a few minutes to complete the written survey that you may receive in the mail after your visit with us. Thank you!             Your Updated Medication List - Protect others around you: Learn how to safely use, store and throw away your medicines at www.disposemymeds.org.          This list is accurate as of 10/3/18  1:34 PM.  Always use your most recent med list.                   Brand Name Dispense Instructions for use Diagnosis    order for DME     2 each    Equipment being ordered:emWave (Heartmath) personal stress reliever and/or PC-desktop for heart rate variability biofeedback training.    Anxiety, Arachnophobia, Chronic motor tic disorder                  [facility]    Thank you for choosing [facility] for your health care needs. Below is some information for patients who are interested in having  their follow-up visit with a physician by telephone. In some cases, a telephone visit can be an effective and convenient way to manage your follow-up care. Choosing a telephone visit rather than a face to face visit for your follow-up care is a decision that you and your physician can make together to ensure it meets all of your needs.  A face to face visit is always an available option, if you choose to do so.     We want to make sure you have all of the information you need about the telephone visit option and answer all of your questions before you decide to schedule a telephone follow-up visit. If you have any questions, you may talk to a staff member or our financial counselor at [phone].    1. General overview    Our clinic sees patients for a variety of conditions and concerns. A face to face visit with your doctor is required for any new concerns or for your initial visit. If you and your doctor decide that a follow up visit by telephone is appropriate, you may decide to opt for a telephone visit.     2.  Billing and insurance coverage    There is a charge for telephone visits, similar to the charge for an in-person visit. Your bill is based on the amount of time you and your physician are on the phone. We will bill each visit to your insurance company (just like your other medical visits), and you will be responsible for any costs not paid by your insurance company. Not all insurance companies cover theses visits. At this time, we are aware that this is NOT a covered service by [place] Health Care Programs (Medical Assistance Plans), Blue Cross Blue Shield and Medicare. If you want to know what your insurance company will cover, we encourage you to contact them to determine your coverage. The codes below are the codes we use when billing for telephone visits and the associated charges. This may help you work with your insurance company to determine your benefits.       Billing CPT codes for Telephone  visits   38667  5-10 minutes ($30)  62473  11-20 minutes ($35)  17672   21-30 minutes($40)    To schedule a telephone appointment call the clinic at: [phone] and press option #2.   ---------------------------------------------------------------------------------------------------------------------

## 2018-10-03 NOTE — LETTER
"  10/3/2018      RE: Ramesh Jo  [address]  [facility] [address]       SUBJECTIVE:  Ramesh is a 18 year old male, here  for follow-up of developmental-behavioral problems.     Interim History:    he has been dealing with the increasing \"urge\" to quit school but he's not yet sure what would replace it yet; he's enjoying his job at [employer]; feels that he needs/wants one or more academic \"mentors\" as Dr. Vitale recently suggested but has no ideas about whom this might be (and wonders too about mentors outside of academics)    regarding his email below -- which we discussed -- he says that some of this, especially the part about trying medications again, is more his Mom's idea than his; he remains ambivalent and skeptical about medications but feels that microdosing LSD would be the best option assuming he finds out purity and dose    he's still wondering about hypnosis -- hasn't followed up with Dr. Vitale about using formal hypnosis methods, continues to feel that self-hypnosis is challenging for him to do but he wants to be able to use it to move forward with his goals    goals right now include:    shift and persist --  he developed with me today a metaphor of growing plants in the cold and moving them in until the time and temperature are right for that plant    one thing at a time -- he developed a metaphor of the nozzle on a garden watering hose, with different settings only one of which is used at time, and doing one part of watering at a time for the different parts of the garden    Objective:  There were no vitals taken for this visit.   EXAM:  Examination deferred    DATA:  The following standardized developmental-behavioral assessments were scored and interpreted today with them, distinct from the rest of the evaluation and management that took place:  1. n/a    As described below, today's Diagnostic ASSESSMENT and Diagnostic/Therapeutic PLAN were discussed with the patient and family, and I " provided them with extensive counseling and eduction as follows:  1. Generalized anxiety disorder    2. Attention deficit hyperactivity disorder (ADHD), predominantly inattentive type    3. Chronic motor tic disorder    4. 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    Diagnostic Plan:    deferred but rule out Obsessive-Compulsive Disorder     Counseled regarding:    self-efficacy    ego-strengthening suggestions    motivational interviewing regarding resolving his ambivalence about staying in school or not    guidance and education regarding multimodal, evidence-based interventions for attention-deficit/hyperactivity disorder and anxiety     self-hypnosis using metaphors above for his self-identified goals; alert, open-eyed with gaze-fixation induction (and I have him a gaze-fixation device to use at home)    Therapeutic Interventions:    continue individual psychotherapy with Dr. Vitale    Current Outpatient Prescriptions   Medication Sig Dispense Refill     ORDER FOR DME Equipment being ordered:emWave ([employer]) personal stress reliever and/or PC-desktop for heart rate variability biofeedback training. 2 each 0     There are no discontinued medications.      consider Effexor XR to target anxious and inattentive symptoms      Follow-up -- 1-3 months per his preference     40 minutes and More than 50% of the time spent on counseling / coordinating care    Bin Perrin MD, MPH  , [facility]  Developmental-Behavioral Pediatrics  __________________________________________________________         On Sep 27, 2018, at 5:25 PM, Ramesh Jo [email or web address] wrote:    Donald Perrin       Iâ  ve been having a lot of trouble starting the school year and getting through each day in general. Most of the time Iâ  m not really able to do much of anything other than avoid most of my  commitments and even a lot of situations I would ordinarily enjoy. I think it might have been a bit of a hasty decision going back, but Iâ  m working with the disability office and some other resources around campus to explore what I can do about potentially taking a reduced load or another leave, and also about the alternative learning options that might be available like we have talked about. Overall, Iâ  m a lot more concerned about figuring out how to handle all these situations in general mentally, and not so much about what I consider relatively small things like school that would probably resolve with much more ease if I was able to handle my anxiety. Still I think building up the ability to do those small things Iâ  ve slowly withdrawn from over time is probably a big part of what I need to work on, but Iâ  m not quite sure what the balance is between making some forward motion and appeasing my urge to give up on most things.    I have a lot of weekly appointments set up with Dr. Vitale, which based on both of our availabilities hasnâ  t happened in this kind of a streak before, and I really think that will help quite a bit. Still, sometimes as good as the advice he gives me and the conversations we have are, I have so much trouble putting anything into action, that things kind of stagnate and revert back to where they were before seeing him again in a bit of a cycle. I really do think that the consistency I have lined up in the next few weeks and hopefully after that if I think thatâ  s necessary will help with that, as heâ  s pretty understanding of my inability to work on the things we talk about.    I am once again wondering if a little bit of a boost with some kind of medication might be helpful, it seems like everything is a little too much for me to handle on my own, but as always Iâ  m a little skeptical of potential side effects and so on.  My mom has suggested looking into Wellbutrin, which I  donâ  t know too much about other than that it is not an SSRI, or related class of medications, which makes me a little more open to maybe trying a low dose, but more than anything I guess Iâ  d like to hear what your thoughts are and have a conversation about that. Another thing Iâ  ve thought about is giving the idea of microdosing with LSD a try, which is something I think I have a little bit of confidence in just from my own experiences. From the one or two times I tried this, I found it to be effective enough to at least be curious about going through something like a month long regimen with doses spaced apart by a few days, like some people have recommended in what Iâ  ve read online. The only downside I noticed in my limited experience was that kind of stimulation weâ  ve talked about that I just canâ  t stand, but this was with what I can at best approximate to be about 10 micrograms, and people online have recommended that if there is any noticeable physical effect like that, 5 or even as low as 2 can be very helpful to many people.    These are just my preliminary thoughts, and more than anything Iâ  d like to hear what you have to say, maybe once youâ  re able to get back to me, we can think about scheduling a phone appointment or something? As always, thanks a lot for all your help and hope youâ  re doing well,    Ramesh Perrin MD

## 2018-10-11 ENCOUNTER — TELEPHONE (OUTPATIENT)
Dept: PEDIATRICS | Facility: CLINIC | Age: 18
End: 2018-10-11

## 2018-10-11 DIAGNOSIS — F41.1 GENERALIZED ANXIETY DISORDER: Primary | ICD-10-CM

## 2018-10-11 RX ORDER — VENLAFAXINE HYDROCHLORIDE 37.5 MG/1
CAPSULE, EXTENDED RELEASE ORAL
Qty: 46 CAPSULE | Refills: 0 | Status: SHIPPED | OUTPATIENT
Start: 2018-10-11 | End: 2018-12-17

## 2018-10-11 NOTE — TELEPHONE ENCOUNTER
On Oct 8, 2018, at 12:34 PM, Ramesh Jo <[email or web address]> wrote:    Hi, sorry I lost my phone so I wasn't able to get back to you until I found a computer to use. I'm having some second thoughts about this and think I may want to wait a little bit longer before committing to trying any medication. Either way, [facility] [address] would be a good pharmacy.    On Sat, Oct 6, 2018 at 4:33 PM Michael Perrin <[email or web address]> wrote:  Ok. Will email you the plan on Monday. What pharmacy do u want to use?    > On Oct 5, 2018, at 11:20 AM, Ramesh Jo [email or web address]> wrote:  >   > Hi Dr. Perrin,  >   > I think I want to try something, whatever you would recommend at this point. I'm having trouble getting anything done.  >   > Thanks,  > Ramesh

## 2018-12-17 ENCOUNTER — TELEPHONE (OUTPATIENT)
Dept: PEDIATRICS | Facility: CLINIC | Age: 18
End: 2018-12-17

## 2018-12-17 DIAGNOSIS — F41.1 GENERALIZED ANXIETY DISORDER: ICD-10-CM

## 2018-12-17 RX ORDER — VENLAFAXINE HYDROCHLORIDE 37.5 MG/1
CAPSULE, EXTENDED RELEASE ORAL
Qty: 46 CAPSULE | Refills: 1 | Status: SHIPPED | OUTPATIENT
Start: 2018-12-17 | End: 2019-04-08

## 2018-12-17 NOTE — TELEPHONE ENCOUNTER
Dr. Perrin    Received refill request for venlafaxine HCL ER 75 mg tab, quantity 16, last refill 11/29/18 .    Please advise    Thanks,    Jie

## 2019-01-15 DIAGNOSIS — F41.1 GENERALIZED ANXIETY DISORDER: ICD-10-CM

## 2019-01-15 NOTE — TELEPHONE ENCOUNTER
Dear Prior Authorization Team,     We received a prior authorization request from [facility] pharmacy for a 90 day prescription of Venlafaxine HCL ER 75 mg capsule.     Pt pharmacy: [facility]/ pharmacy [address] [place] [id number]    Please process PA, and let me know when it has been approved.     Thank you,   Gloria Patel CMA

## 2019-01-17 NOTE — TELEPHONE ENCOUNTER
Received 90 day medication dispense request from pharmacy.  Had provider complete and faxed back to pharmacy.     lGoria Patel CMA

## 2019-01-23 NOTE — TELEPHONE ENCOUNTER
Central Prior Authorization Team   Phone: [phone]    PA Initiation    Medication: venlafaxine 75 mg 90/90 - no PA required  Insurance Company: [employer] - Phone [phone] Fax [phone]  Pharmacy Filling the Rx: [id number] - [place], [place] - [address]   Filling Pharmacy Phone: [phone]  Filling Pharmacy Fax:    Start Date: 1/23/2019    No PA required for 75 mg, pharmacy ran 90/90

## 2019-04-08 ENCOUNTER — TELEPHONE (OUTPATIENT)
Dept: PEDIATRICS | Facility: CLINIC | Age: 19
End: 2019-04-08

## 2019-04-08 NOTE — TELEPHONE ENCOUNTER
ramesh Robles, Apr 5, 11:05 AM (3 days ago)  to me    Hi Dr. Perrin,    I wanted to update you and ask you about my meds since we won't be having an appointment for a while. After taking the effexor for a few months now I really think at best it's doing nothing, and I'm more inclined to think it's negatively impacting my mood and making me feel sleepy consistently. I'm also still pretty concerned about increased dependency and difficulty weaning off the longer I take it, and any side effects that may come when I try to stop. If you think it would be ok, I would like to start weaning off the meds. I think I'm ready to focus on things me and Dr. Vitale have discussed and work on adjusting my lifestyle without any medication.     Hope you're well,    Ramesh Perrin <[email or web address]>  3:27 PM (0 minutes ago)  to ramesh    Makes sense. You should take 37.5 mg for 2 weeks, then stop. Let me know if you need a new rx for that (and which pharmacy).  --                   ----------------  Bin Perrin MD, MPH   & Fellowship Director  Developmental-Behavioral Pediatrics    [address], [address] [address]    Phones: [phone] ([facility])                  [phone] ([facility])  Fax: [phone]     Email: [email or web address]                      ----------------

## 2019-04-19 DIAGNOSIS — F41.1 GENERALIZED ANXIETY DISORDER: Primary | ICD-10-CM

## 2019-04-19 NOTE — TELEPHONE ENCOUNTER
Refill requested from patient s pharmacy for Venlafaxine HCL ER 75 mg Capsule Qty 90.  Patient was last seen 10/3/2018 and has an appointment scheduled for 6/27/2019.  Pended orders to Dr. Perrin on 4/19/19 to approve or deny the request.      Gloria Patel CMA

## 2019-04-22 RX ORDER — VENLAFAXINE HYDROCHLORIDE 75 MG/1
75 CAPSULE, EXTENDED RELEASE ORAL DAILY
Qty: 90 CAPSULE | Refills: 3 | Status: SHIPPED | OUTPATIENT
Start: 2019-04-22 | End: 2019-06-27

## 2019-06-27 ENCOUNTER — OFFICE VISIT (OUTPATIENT)
Dept: PEDIATRICS | Facility: CLINIC | Age: 19
End: 2019-06-27
Attending: PEDIATRICS
Payer: COMMERCIAL

## 2019-06-27 VITALS
SYSTOLIC BLOOD PRESSURE: 120 MMHG | HEART RATE: 58 BPM | WEIGHT: 143.7 LBS | HEIGHT: 70 IN | DIASTOLIC BLOOD PRESSURE: 79 MMHG | BODY MASS INDEX: 20.57 KG/M2

## 2019-06-27 DIAGNOSIS — F41.1 GENERALIZED ANXIETY DISORDER: Primary | ICD-10-CM

## 2019-06-27 DIAGNOSIS — F95.1 CHRONIC MOTOR TIC DISORDER: ICD-10-CM

## 2019-06-27 PROCEDURE — G0463 HOSPITAL OUTPT CLINIC VISIT: HCPCS | Mod: ZF

## 2019-06-27 ASSESSMENT — MIFFLIN-ST. JEOR: SCORE: 1663.07

## 2019-06-27 NOTE — LETTER
"  6/27/2019      RE: Ramesh Jo  [address] [facility] [address]       SUBJECTIVE:  Ramesh is a 19 year old male, here for follow-up of developmental-behavioral problems. Joined for today's visit by Lindsay Moran MS4.    Interim History:    anxiety stable; feeling more ready to begin looking for a mentor whose lab he could work in for free to help him move forward towards learning more about the areas he'd like to work in the future    saw Dr. Vitale several times since our last visit, taking a break (as he ran out of appointment slots and hasn't yet made more, but wants to)    wants to \"change some habits\"    doing self-hypnosis sometimes, finds it helpful when he does it more often    found Effexor XR unhelpful and possibly associated with somnolence and low mood    using occasional cannabis recreationally; still considering trying microdosing lsd but hasn't done so except for a week a few years ago    motor tics not discussed today     is working part-time at [employer] in [place] [place], and [employer] [place] some nights for extra money; will move in with 2 current roommates, one male and one female, to a home in [place]    he's found that he enjoys supporting his roommate's brother who has schizophrenia (and is their age) because other friends stigmatize this youth, but he wishes he could be more dedicated to this and 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 n/a    As described below, today's Diagnostic " ASSESSMENT and Diagnostic/Therapeutic PLAN were discussed with the patient and family, and I provided them with extensive counseling and eduction as follows:  1. Generalized anxiety disorder    2. Chronic motor tic disorder        Overall, stable.    Diagnostic Plan:    deferred     Counseled regarding:    self-efficacy    ego-strengthening suggestions    guidance and education regarding multimodal, evidence-based interventions for anxiety     Therapeutic Interventions:    reestablish care with Dr. Vitale to work on habit change    No current outpatient medications on file.     There are no discontinued medications.      Follow-up -- 3-4 months     40 minutes and More than 50% of the time spent on counseling / coordinating care    Bin Perrin MD, MPH  , [facility]  Developmental-Behavioral Pediatrics  __________________________________________________________         Bin Perrin MD

## 2019-06-27 NOTE — PROGRESS NOTES
"SUBJECTIVE:  Ramesh is a 19 year old male, here for follow-up of developmental-behavioral problems. Joined for today's visit by Lindsay Moran, MS4.    Interim History:    anxiety stable; feeling more ready to begin looking for a mentor whose lab he could work in for free to help him move forward towards learning more about the areas he'd like to work in the future    saw Dr. Vitale several times since our last visit, taking a break (as he ran out of appointment slots and hasn't yet made more, but wants to)    wants to \"change some habits\"    doing self-hypnosis sometimes, finds it helpful when he does it more often    found Effexor XR unhelpful and possibly associated with somnolence and low mood    using occasional cannabis recreationally; still considering trying microdosing lsd but hasn't done so except for a week a few years ago    motor tics not discussed today     is working part-time at [employer] in [place] [facility], and [employer] [place] some nights for extra money; will move in with 2 current roommates, one male and one female, to a home in [place]    he's found that he enjoys supporting his roommate's brother who has schizophrenia (and is their age) because other friends stigmatize this youth, but he wishes he could be more dedicated to this and 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 n/a    As described below, today's Diagnostic ASSESSMENT and Diagnostic/Therapeutic PLAN were discussed with the patient and family, and I " provided them with extensive counseling and eduction as follows:  1. Generalized anxiety disorder    2. Chronic motor tic disorder        Overall, stable.    Diagnostic Plan:    deferred     Counseled regarding:    self-efficacy    ego-strengthening suggestions    guidance and education regarding multimodal, evidence-based interventions for anxiety     Therapeutic Interventions:    reestablish care with Dr. Vitale to work on habit change    No current outpatient medications on file.     There are no discontinued medications.      Follow-up -- 3-4 months     40 minutes and More than 50% of the time spent on counseling / coordinating care    Bin Perrin MD, MPH  , [facility]  Developmental-Behavioral Pediatrics  __________________________________________________________

## 2019-06-27 NOTE — PATIENT INSTRUCTIONS
Thank you for choosing the [facility] s Developmental and Behavioral Pediatrics Department for your care!     To Schedule appointments please contact the [facility] at [phone].   For refills please call the [facility] [phone] or contact us via your [employer] account.  Please allow 5-7 days for your refill request to be processed and sent to your pharmacy.   For behavioral emergencies (immediate concern for your child s safety or the safety of another) please contact the [facility] at [phone], go to your local Emergency Department or call 211.     For non-emergencies contact the [facility] at [phone] or reach out to us via [employer]. Please allow 3 business days for a response.

## 2019-06-27 NOTE — NURSING NOTE
"Chief Complaint   Patient presents with     RECHECK     counseling     /79   Pulse 58   Ht 5' 10\" (177.8 cm)   Wt 143 lb 11.2 oz (65.2 kg)   BMI 20.62 kg/m      Gloria Patel CMA    "

## 2019-10-02 ENCOUNTER — HEALTH MAINTENANCE LETTER (OUTPATIENT)
Age: 19
End: 2019-10-02

## 2021-01-15 ENCOUNTER — HEALTH MAINTENANCE LETTER (OUTPATIENT)
Age: 21
End: 2021-01-15

## 2021-09-04 ENCOUNTER — HEALTH MAINTENANCE LETTER (OUTPATIENT)
Age: 21
End: 2021-09-04

## 2022-02-19 ENCOUNTER — HEALTH MAINTENANCE LETTER (OUTPATIENT)
Age: 22
End: 2022-02-19

## 2022-10-22 ENCOUNTER — HEALTH MAINTENANCE LETTER (OUTPATIENT)
Age: 22
End: 2022-10-22

## 2023-04-01 ENCOUNTER — HEALTH MAINTENANCE LETTER (OUTPATIENT)
Age: 23
End: 2023-04-01